# Patient Record
(demographics unavailable — no encounter records)

---

## 2025-06-17 NOTE — HISTORY OF PRESENT ILLNESS
[Doing Well] : is doing well [de-identified] : Right IF retinacular cyst excision x2 weeks prior  Doing well, no pain and full ROM [de-identified] : Right index finger  incision healed Full painless ROM RDN an UDN intact [de-identified] : -would like to schedule left volar wrist mass excision